# Patient Record
Sex: FEMALE | Race: WHITE
[De-identification: names, ages, dates, MRNs, and addresses within clinical notes are randomized per-mention and may not be internally consistent; named-entity substitution may affect disease eponyms.]

---

## 2017-03-29 ENCOUNTER — HOSPITAL ENCOUNTER (EMERGENCY)
Dept: HOSPITAL 75 - ER | Age: 42
Discharge: HOME | End: 2017-03-29
Payer: COMMERCIAL

## 2017-03-29 VITALS — WEIGHT: 117 LBS | HEIGHT: 61 IN | BODY MASS INDEX: 22.09 KG/M2

## 2017-03-29 VITALS — DIASTOLIC BLOOD PRESSURE: 96 MMHG | SYSTOLIC BLOOD PRESSURE: 151 MMHG

## 2017-03-29 DIAGNOSIS — G43.909: ICD-10-CM

## 2017-03-29 DIAGNOSIS — J30.1: ICD-10-CM

## 2017-03-29 DIAGNOSIS — E86.9: ICD-10-CM

## 2017-03-29 DIAGNOSIS — R11.2: Primary | ICD-10-CM

## 2017-03-29 LAB
ALBUMIN SERPL-MCNC: 4.3 G/DL (ref 3.2–4.5)
ALT SERPL-CCNC: 9 U/L (ref 0–55)
ANION GAP SERPL CALC-SCNC: 10 MMOL/L (ref 5–14)
AST SERPL-CCNC: 12 U/L (ref 5–34)
BASOPHILS # BLD AUTO: 0 10^3/UL (ref 0–0.1)
BASOPHILS NFR BLD AUTO: 0 % (ref 0–10)
BILIRUB SERPL-MCNC: 0.3 MG/DL (ref 0.1–1)
BUN SERPL-MCNC: 9 MG/DL (ref 7–18)
BUN/CREAT SERPL: 11
CALCIUM SERPL-MCNC: 9.5 MG/DL (ref 8.5–10.1)
CHLORIDE SERPL-SCNC: 106 MMOL/L (ref 98–107)
CO2 SERPL-SCNC: 24 MMOL/L (ref 21–32)
CREAT SERPL-MCNC: 0.79 MG/DL (ref 0.6–1.3)
EOSINOPHIL # BLD AUTO: 0 10^3/UL (ref 0–0.3)
EOSINOPHIL NFR BLD AUTO: 1 % (ref 0–10)
ERYTHROCYTE [DISTWIDTH] IN BLOOD BY AUTOMATED COUNT: 14.9 % (ref 10–14.5)
GFR SERPLBLD BASED ON 1.73 SQ M-ARVRAT: > 60 ML/MIN
GLUCOSE SERPL-MCNC: 114 MG/DL (ref 70–105)
LYMPHOCYTES # BLD AUTO: 1.9 X 10^3 (ref 1–4)
LYMPHOCYTES NFR BLD AUTO: 23 % (ref 12–44)
MCH RBC QN AUTO: 25 PG (ref 25–34)
MCHC RBC AUTO-ENTMCNC: 32 G/DL (ref 32–36)
MCV RBC AUTO: 79 FL (ref 80–99)
MONOCYTES # BLD AUTO: 0.5 X 10^3 (ref 0–1)
MONOCYTES NFR BLD AUTO: 6 % (ref 0–12)
NEUTROPHILS # BLD AUTO: 5.8 X 10^3 (ref 1.8–7.8)
NEUTROPHILS NFR BLD AUTO: 71 % (ref 42–75)
PLATELET # BLD: 524 10^3/UL (ref 130–400)
PMV BLD AUTO: 9 FL (ref 7.4–10.4)
POTASSIUM SERPL-SCNC: 3.8 MMOL/L (ref 3.6–5)
PROT SERPL-MCNC: 7.3 G/DL (ref 6.4–8.2)
RBC # BLD AUTO: 4.6 10^6/UL (ref 4.35–5.85)
SODIUM SERPL-SCNC: 140 MMOL/L (ref 135–145)
WBC # BLD AUTO: 8.2 10^3/UL (ref 4.3–11)

## 2017-03-29 PROCEDURE — 36415 COLL VENOUS BLD VENIPUNCTURE: CPT

## 2017-03-29 PROCEDURE — 80053 COMPREHEN METABOLIC PANEL: CPT

## 2017-03-29 PROCEDURE — 96361 HYDRATE IV INFUSION ADD-ON: CPT

## 2017-03-29 PROCEDURE — 96374 THER/PROPH/DIAG INJ IV PUSH: CPT

## 2017-03-29 PROCEDURE — 93041 RHYTHM ECG TRACING: CPT

## 2017-03-29 PROCEDURE — 84703 CHORIONIC GONADOTROPIN ASSAY: CPT

## 2017-03-29 PROCEDURE — 99282 EMERGENCY DEPT VISIT SF MDM: CPT

## 2017-03-29 PROCEDURE — 96375 TX/PRO/DX INJ NEW DRUG ADDON: CPT

## 2017-03-29 PROCEDURE — 85025 COMPLETE CBC W/AUTO DIFF WBC: CPT
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
## 2017-04-23 NOTE — XMS REPORT
Continuity of Care Document

 Created on: 2017



DEVYN HUMMEL

External Reference #: [id number]

: 1975

Sex: Female



Demographics







 Address  [address] [place] [address]  [address]

 

 Home Phone  [phone]

 

 Preferred Language  Unknown

 

 Marital Status  Unknown

 

 Zoroastrianism Affiliation  Unknown

 

 Race  Unknown

 

 Ethnic Group  Unknown





Author







 Author  [employer]

 

 Organization  [facility]

 

 Address  Unknown

 

 Phone  Unavailable







Care Team Providers







 Care Team Member Name  Role  Phone

 

 [employer]  Unavailable  Unavailable



                                    



Problems

                    





 Problem                          Status                          Onset Date   
                       Classification                          Date Reported   
                       Comments                          Source                
    

 

 Disease of breast (disorder)                                                  
  2017                          Diagnosis                          2017                                                    [facility]                    

 

 Localized obesity (disorder)                                                  
  2017                          Diagnosis                          2017                                                    [facility]                    

 

 Excess skin of abdominal wall (finding)                                       
             2017                          Diagnosis                     
     2017                                                    [facility]                    



                                                                               
                                     



Medications

                    





 Medication                          Details                          Route    
                      Status                          Patient Instructions     
                     Ordering Provider                          Order Date     
                     Source                    

 

 No Known Medications                          No known medications            
                                        Active                                 
                                                                       [facility]                    



                                                                               
     



Allergies, Adverse Reactions, Alerts

                    





 Substance                          Category                          Reaction 
                         Severity                          Reaction type       
                   Status                          Date Reported               
           Comments                          Source                    

 

 Hydrocodone                          Assertion                          
itching                                                    Drug allergy        
                                                                               
                 [employer]                    



                                                                    



Immunizations

                    





 Immunization                          Date Given                          Site
                          Status                          Last Updated         
                 Comments                          Source                    

 

 No data available for this section                                            
                                  No data available for this section           
                                                                   [facility]                    



                                                                               
     



Results

                                                                



Vital Signs

                                                                                



Encounters

                    





 Location                          Location Details                          
Encounter Type                          Encounter Number                        
  Reason For Visit                          Attending Provider                 
         ADM Date                          DC Date                          
Status                          Source                    

 

 [facility]                          [id number]                          Outpatient   
                       [id number]                                                
    Omarjose eduardo Gooden                           2017                          Active                          [facility]                    



                                                                        



Procedures

                    





 Procedure                          Code                          Date         
                 Perfomer                          Comments                    
      Source                    

 

 Abdominoplasty, Liposuction Lower Back And Flanks With Fat Transfer To Buttocks
, Bilateral Breast Augmentation                                                
    2017                                                                 
             [facility]                    

 

 Breast implant removal and upper blepharoplasty                               
                     2016                                                
                              [facility]                    

 

 elbow fx repair                                                    2010 
                                                                             
[facility]                    

 

 breast augmentation                                                    2009                                                                           
   [facility]                    

 

 mastopexy; Implant removal                                                    
2008                                                                     
         [facility]                    

 

 breast augmentation                                                    2005                                                                           
   [facility]                    

 

 gastric bypass                                                    2004  
                                                                            
[facility]                    



                                                                               
                                                                               
                      



Plan of Care

                                                                



Social History

                                                                        



Assessment and Plan

                                                                



Family History

                    





 Value                          Date                          Source           
         



                                                        



Advance Directives

                    





 Order Name                          Results                          Value    
                      Date                          Source

## 2017-04-23 NOTE — XMS REPORT
Continuity of Care Document

 Created on: 2017



ROSA ISELA HUMMEL

External Reference #: [id number]

: 1975

Sex: Female



Demographics







 Address  [address] [place] [id number]

 

 Home Phone  [phone]

 

 Preferred Language  Unknown

 

 Marital Status  Unknown

 

 Mandaen Affiliation  Unknown

 

 Race  Unknown

 

 Ethnic Group  Unknown





Author







 Author  [facility]

 

 Organization  [facility]

 

 Address  Unknown

 

 Phone  Unavailable



                                                      



Allergies

                      





 Active                    Description                    Code                  
  Type                    Severity                    Reaction                  
  Onset                    Reported/Identified                    Relationship 
to Patient                    Clinical Status                

 

 Yes                    morphine                    [id number]                 
   Drug Allergy                    Unknown                    N/A              
                           2017                                          
                



                                                                               
         



Medications

                                                                               
         



Problems

                      





 Date Dx Coded                    Attending                    Type            
        Code                    Diagnosis                    Diagnosed By      
          

 

 2015                    RICHIE COLON, CLIFFORD BASSETT                    Ot        
            Z12.31                                                          

 

 2017                    DAYSI PANCHAL DO                    Ot           
         E86.9                    VOLUME DEPLETION, UNSPECIFIED                
                     

 

 2017                    NOBLE PANCHAL DOA BOBBI                    Ot           
         G43.909                    MIGRAINE, UNSP, NOT INTRACTABLE, WITHOUT   
                                  

 

 2017                    DAYSI PANCHAL DO                    Ot           
         J30.1                    ALLERGIC RHINITIS DUE TO POLLEN              
                       

 

 2017                    DAYSI PANCHAL DO                    Ot           
         R11.2                    NAUSEA WITH VOMITING, UNSPECIFIED            
                         



                                                                               
                                                 



Procedures

                                                                               
         



Results

                      





 Test                    Result                    Range                









 Complete blood count (CBC) with automated white blood cell (WBC) differential 
- 17 18:45                









 Blood leukocytes automated count (number/volume)                    8.2 10*3/
uL                    4.3-11.0                

 

 Blood erythrocytes automated count (number/volume)                    4.60 10*6
/uL                    4.35-5.85                

 

 Venous blood hemoglobin measurement (mass/volume)                    11.5 g/dL
                    11.5-16.0                

 

 Blood hematocrit (volume fraction)                    37 %                    
35-52                

 

 Automated erythrocyte mean corpuscular volume                    79 [foz_us]  
                  80-99                

 

 Automated erythrocyte mean corpuscular hemoglobin (mass per erythrocyte)      
              25 pg                    25-34                

 

 Automated erythrocyte mean corpuscular hemoglobin concentration measurement (
mass/volume)                    32 g/dL                    32-36                

 

 Automated erythrocyte distribution width ratio                    14.9 %      
              10.0-14.5                

 

 Automated blood platelet count (count/volume)                    524 10*3/uL  
                  130-400                

 

 Automated blood platelet mean volume measurement                    9.0 [foz_us
]                    7.4-10.4                

 

 Automated blood neutrophils/100 leukocytes                    71 %            
        42-75                

 

 Automated blood lymphocytes/100 leukocytes                    23 %            
        12-44                

 

 Blood monocytes/100 leukocytes                    6 %                    0-12 
               

 

 Automated blood eosinophils/100 leukocytes                    1 %             
       0-10                

 

 Automated blood basophils/100 leukocytes                    0 %               
     0-10                

 

 Blood neutrophils automated count (number/volume)                    5.8 10*3 
                   1.8-7.8                

 

 Blood lymphocytes automated count (number/volume)                    1.9 10*3 
                   1.0-4.0                

 

 Blood monocytes automated count (number/volume)                    0.5 10*3   
                 0.0-1.0                

 

 Automated eosinophil count                    0.0 10*3/uL                    
0.0-0.3                

 

 Automated blood basophil count (count/volume)                    0.0 10*3/uL  
                  0.0-0.1                









 Serum or plasma choriogonadotropin (pregnancy test) detection - 17 18:45
                









 Serum or plasma choriogonadotropin (pregnancy test) detection                 
   NEGATIVE                     NEGATIVE                









 Comprehensive metabolic panel - 17 18:45                









 Serum or plasma sodium measurement (moles/volume)                    140 mmol/
L                    135-145                

 

 Serum or plasma potassium measurement (moles/volume)                    3.8 
mmol/L                    3.6-5.0                

 

 Serum or plasma chloride measurement (moles/volume)                    106 mmol
/L                                    

 

 Carbon dioxide                    24 mmol/L                    21-32          
      

 

 Serum or plasma anion gap determination (moles/volume)                    10 
mmol/L                    5-14                

 

 Serum or plasma urea nitrogen measurement (mass/volume)                    9 mg
/dL                    7-18                

 

 Serum or plasma creatinine measurement (mass/volume)                    0.79 mg
/dL                    0.60-1.30                

 

 Serum or plasma urea nitrogen/creatinine mass ratio                    11     
                NRG                

 

 Serum or plasma creatinine measurement with calculation of estimated 
glomerular filtration rate                    >                     NRG        
        

 

 Serum or plasma glucose measurement (mass/volume)                    114 mg/dL
                                    

 

 Serum or plasma calcium measurement (mass/volume)                    9.5 mg/dL
                    8.5-10.1                

 

 Serum or plasma total bilirubin measurement (mass/volume)                    
0.3 mg/dL                    0.1-1.0                

 

 Serum or plasma alkaline phosphatase measurement (enzymatic activity/volume)  
                  89 U/L                                    

 

 Serum or plasma aspartate aminotransferase measurement (enzymatic activity/
volume)                    12 U/L                    5-34                

 

 Serum or plasma alanine aminotransferase measurement (enzymatic activity/volume
)                    9 U/L                    0-55                

 

 Serum or plasma protein measurement (mass/volume)                    7.3 g/dL 
                   6.4-8.2                

 

 Serum or plasma albumin measurement (mass/volume)                    4.3 g/dL 
                   3.2-4.5                



                                                                               
                   



Encounters

                      





 ACCT No.                    Visit Date/Time                    Discharge      
              Status                    Pt. Type                    Provider   
                 Facility                    Loc./Unit                    
Complaint                

 

 [id number]                    2017 18:01:00                    2017 20:57:00                    DIS                    Outpatient             
       DAYSI PANCHAL DO                    [facility]     
               ER                    NAUSEA/VOMITING/MIGRAINE                

 

 [id number]                    10/13/2015 09:50:00                    10/13/
2015 23:59:59                    CLS                    Outpatient             
       RICHIE COLON, CLIFFORD BASSETT                    [facility]  
                  RAD

## 2017-12-28 ENCOUNTER — HOSPITAL ENCOUNTER (OUTPATIENT)
Dept: HOSPITAL 75 - SDC | Age: 42
End: 2017-12-28
Attending: INTERNAL MEDICINE
Payer: COMMERCIAL

## 2017-12-28 VITALS — BODY MASS INDEX: 22.09 KG/M2 | HEIGHT: 61 IN | WEIGHT: 117 LBS

## 2017-12-28 VITALS — DIASTOLIC BLOOD PRESSURE: 89 MMHG | SYSTOLIC BLOOD PRESSURE: 145 MMHG

## 2017-12-28 DIAGNOSIS — D50.9: Primary | ICD-10-CM

## 2017-12-28 PROCEDURE — 96365 THER/PROPH/DIAG IV INF INIT: CPT

## 2018-01-30 ENCOUNTER — HOSPITAL ENCOUNTER (OUTPATIENT)
Dept: HOSPITAL 75 - SDC | Age: 43
End: 2018-01-30
Attending: INTERNAL MEDICINE
Payer: COMMERCIAL

## 2018-01-30 VITALS — BODY MASS INDEX: 22.09 KG/M2 | WEIGHT: 117 LBS | HEIGHT: 61 IN

## 2018-01-30 VITALS — DIASTOLIC BLOOD PRESSURE: 71 MMHG | SYSTOLIC BLOOD PRESSURE: 133 MMHG

## 2018-01-30 VITALS — SYSTOLIC BLOOD PRESSURE: 133 MMHG | DIASTOLIC BLOOD PRESSURE: 71 MMHG

## 2018-01-30 DIAGNOSIS — D50.9: Primary | ICD-10-CM

## 2018-01-30 PROCEDURE — 96365 THER/PROPH/DIAG IV INF INIT: CPT

## 2018-01-30 PROCEDURE — 96366 THER/PROPH/DIAG IV INF ADDON: CPT

## 2021-04-28 ENCOUNTER — HOSPITAL ENCOUNTER (EMERGENCY)
Dept: HOSPITAL 75 - ER | Age: 46
Discharge: HOME | End: 2021-04-28
Payer: COMMERCIAL

## 2021-04-28 VITALS — BODY MASS INDEX: 24.56 KG/M2 | HEIGHT: 60.98 IN | WEIGHT: 130.07 LBS

## 2021-04-28 VITALS — DIASTOLIC BLOOD PRESSURE: 86 MMHG | SYSTOLIC BLOOD PRESSURE: 124 MMHG

## 2021-04-28 DIAGNOSIS — R25.3: Primary | ICD-10-CM

## 2021-04-28 DIAGNOSIS — Z79.890: ICD-10-CM

## 2021-04-28 DIAGNOSIS — E03.9: ICD-10-CM

## 2021-04-28 LAB
ALBUMIN SERPL-MCNC: 4.2 GM/DL (ref 3.2–4.5)
ALP SERPL-CCNC: 115 U/L (ref 40–136)
ALT SERPL-CCNC: 10 U/L (ref 0–55)
AMORPH SED URNS QL MICRO: (no result) /LPF
APTT PPP: YELLOW S
BACTERIA #/AREA URNS HPF: (no result) /HPF
BARBITURATES UR QL: NEGATIVE
BASOPHILS # BLD AUTO: 0.1 10^3/UL (ref 0–0.1)
BASOPHILS NFR BLD AUTO: 1 % (ref 0–10)
BENZODIAZ UR QL SCN: NEGATIVE
BILIRUB SERPL-MCNC: 0.4 MG/DL (ref 0.1–1)
BILIRUB UR QL STRIP: NEGATIVE
BUN/CREAT SERPL: 10
CALCIUM SERPL-MCNC: 9.2 MG/DL (ref 8.5–10.1)
CHLORIDE SERPL-SCNC: 104 MMOL/L (ref 98–107)
CO2 SERPL-SCNC: 25 MMOL/L (ref 21–32)
COCAINE UR QL: NEGATIVE
CREAT SERPL-MCNC: 1.22 MG/DL (ref 0.6–1.3)
EOSINOPHIL # BLD AUTO: 0.1 10^3/UL (ref 0–0.3)
EOSINOPHIL NFR BLD AUTO: 2 % (ref 0–10)
FIBRINOGEN PPP-MCNC: CLEAR MG/DL
GFR SERPLBLD BASED ON 1.73 SQ M-ARVRAT: 47 ML/MIN
GLUCOSE SERPL-MCNC: 79 MG/DL (ref 70–105)
GLUCOSE UR STRIP-MCNC: NEGATIVE MG/DL
HCT VFR BLD CALC: 39 % (ref 35–52)
HGB BLD-MCNC: 12.5 G/DL (ref 11.5–16)
KETONES UR QL STRIP: NEGATIVE
LEUKOCYTE ESTERASE UR QL STRIP: NEGATIVE
LYMPHOCYTES # BLD AUTO: 2.2 10^3/UL (ref 1–4)
LYMPHOCYTES NFR BLD AUTO: 32 % (ref 12–44)
MANUAL DIFFERENTIAL PERFORMED BLD QL: NO
MCH RBC QN AUTO: 29 PG (ref 25–34)
MCHC RBC AUTO-ENTMCNC: 32 G/DL (ref 32–36)
MCV RBC AUTO: 93 FL (ref 80–99)
METHADONE UR QL SCN: NEGATIVE
METHAMPHETAMINE SCREEN URINE S: NEGATIVE
MONOCYTES # BLD AUTO: 0.4 10^3/UL (ref 0–1)
MONOCYTES NFR BLD AUTO: 6 % (ref 0–12)
NEUTROPHILS # BLD AUTO: 3.9 10^3/UL (ref 1.8–7.8)
NEUTROPHILS NFR BLD AUTO: 59 % (ref 42–75)
NITRITE UR QL STRIP: NEGATIVE
OPIATES UR QL SCN: NEGATIVE
OXYCODONE UR QL: NEGATIVE
PH UR STRIP: 5.5 [PH] (ref 5–9)
PLATELET # BLD: 331 10^3/UL (ref 130–400)
PMV BLD AUTO: 8.9 FL (ref 9–12.2)
POTASSIUM SERPL-SCNC: 3.9 MMOL/L (ref 3.6–5)
PROPOXYPH UR QL: NEGATIVE
PROT SERPL-MCNC: 7.2 GM/DL (ref 6.4–8.2)
PROT UR QL STRIP: NEGATIVE
RBC #/AREA URNS HPF: (no result) /HPF
SODIUM SERPL-SCNC: 138 MMOL/L (ref 135–145)
SP GR UR STRIP: <=1.005 (ref 1.02–1.02)
TRICYCLICS UR QL SCN: POSITIVE
WBC # BLD AUTO: 6.7 10^3/UL (ref 4.3–11)
WBC #/AREA URNS HPF: (no result) /HPF

## 2021-04-28 PROCEDURE — 36415 COLL VENOUS BLD VENIPUNCTURE: CPT

## 2021-04-28 PROCEDURE — 80306 DRUG TEST PRSMV INSTRMNT: CPT

## 2021-04-28 PROCEDURE — 85025 COMPLETE CBC W/AUTO DIFF WBC: CPT

## 2021-04-28 PROCEDURE — 99282 EMERGENCY DEPT VISIT SF MDM: CPT

## 2021-04-28 PROCEDURE — 81000 URINALYSIS NONAUTO W/SCOPE: CPT

## 2021-04-28 PROCEDURE — 80053 COMPREHEN METABOLIC PANEL: CPT

## 2021-04-28 NOTE — ED GENERAL
General


Chief Complaint:  General Problems/Pain


Stated Complaint:  POSSIBLE SEIZURES


Nursing Triage Note:  


AMB  TO ROOM REPORTS SHE HAS BEEN  HAVING ELECTRIC SHOCKS  RUNNING THROUGH BODY.




WTIH JERKY  BODY  MOVEMENTS ONSET TODAY WHILE DRIVING.


Nursing Sepsis Screen:  No Definite Risk


Source of Information:  Patient


Exam Limitations:  No Limitations





History of Present Illness


Date Seen by Provider:  Apr 28, 2021


Time Seen by Provider:  15:15


Initial Comments


Patient is a 45-year-old female who presents to the emergency room today with a 

chief complaint of "electric shocks" causing her to twitch all over, that seem 

to be happening to her entire body.  Patient states that she noted these this 

afternoon.  She was driving at onset of symptoms.  Patient has a history of a 

seizure in the past but is currently off antiseizure medications.  Patient 

states that she has not had any recent illnesses such as fevers, chills cough, 

congestion, shortness of breath.  No nausea vomiting or diarrhea.  She does have

a mild headache that she states is usual for her.  She denies any 

over-the-counter medications and states that she takes medicine for depression 

and thyroid.  She is on Topamax and Adipex as needed for diet regulation.  

Patient denies any drug use, alcohol use or smoking.


She has never had anything like this happen to her before.  No recent stressors.





All other review of systems reviewed and negative except as stated above.


Timing/Duration:  1-3 Hours


Severity:  Moderate


Associated Systoms:  Headaches (chronic)





Allergies and Home Medications


Allergies


Coded Allergies:  


     No Known Drug Allergies (Unverified , 12/28/17)





Home Medications


Amoxicillin/Potassium Clav 1 Each Tablet, 1 EACH PO BID


   Prescribed by: DAYSI PANCHAL on 3/29/17 2000


Levothyroxine Sodium 75 Mcg Tablet, 75 MCG PO DAILY, (Reported)


Ondansetron 4 Mg Tab.rapdis, 4-8 MG PO Q4H


   Prescribed by: DAYSI PANCHAL on 3/29/17 2000





Patient Home Medication List


Home Medication List Reviewed:  Yes





Review of Systems


Review of Systems


Constitutional:  see HPI


EENTM:  no symptoms reported


Respiratory:  no symptoms reported


Cardiovascular:  no symptoms reported


Gastrointestinal:  no symptoms reported


Genitourinary:  no symptoms reported


Pregnant:  No (hysterectomy)


Musculoskeletal:  other (twitching)


Skin:  no symptoms reported


Psychiatric/Neurological:  Tremors





All Other Systems Reviewed


Negative Unless Noted:  Yes





Past Medical-Social-Family Hx


Patient Social History


Recent Infectious Disease Expo:  No


Recent Hopitalizations:  Yes (LOC 12/7/17 PT SEEN IN ER AT [facility])





Immunizations Up To Date


Date of Influenza Vaccine:  Dec 21, 2017





Seasonal Allergies


Seasonal Allergies:  Yes





Past Medical History


Abdominal, Breast, Orthopedic, Tubal Ligation


Headaches /Migraines, Seizure Disorder


GYN History:  Tubal Ligation


Hypothyroidsim





Physical Exam


Vital Signs





Vital Signs - First Documented








 4/28/21





 15:12


 


Temp 36.1


 


Pulse 99


 


Resp 18


 


B/P (MAP) 137/94 (108)


 


Pulse Ox 97


 


O2 Delivery Room Air





Capillary Refill : Less Than 3 Seconds


Height, Weight, BMI


Height: 5'1.00"


Weight: 117lbs. 0.0oz. 53.420609ao; 24.00 BMI


Method:Stated


General Appearance:  No Apparent Distress, WD/WN


Eyes:  Bilateral Eye Normal Inspection, Bilateral Eye PERRL, Bilateral Eye EOMI


HEENT:  PERRL/EOMI


Neck:  Normal Inspection


Respiratory:  Chest Non Tender, Lungs Clear, Normal Breath Sounds, No Accessory 

Muscle Use, No Respiratory Distress


Cardiovascular:  Regular Rate, Rhythm


Gastrointestinal:  Normal Bowel Sounds, Non Tender, Soft


Extremity:  Normal Inspection, Normal Range of Motion, Non Tender, No Calf 

Tenderness, No Pedal Edema


Neurologic/Psychiatric:  Alert, Oriented x3, No Motor/Sensory Deficits, Normal 

Mood/Affect


Skin:  Normal Color, Warm/Dry





Progress/Results/Core Measures


Suspected Sepsis


Recent Fever Within 48 Hours:  No


Infection Criteria Present:  None


New/Unexplained  Altered Menta:  No


Sepsis Screen:  No Definite Risk


SIRS


Temperature: 


Pulse: 99 


Respiratory Rate: 18


 


Laboratory Tests


4/28/21 15:30: White Blood Count 6.7


Blood Pressure 137 /94 


Mean: 108


 











Laboratory Tests


4/28/21 15:30: 


Creatinine 1.22, Platelet Count 331, Total Bilirubin 0.4





Results/Orders


Lab Results





Laboratory Tests








Test


 4/28/21


15:30 4/28/21


15:40 Range/Units


 


 


White Blood Count


 6.7 


 


 4.3-11.0


10^3/uL


 


Red Blood Count


 4.25 


 


 3.80-5.11


10^6/uL


 


Hemoglobin 12.5   11.5-16.0  g/dL


 


Hematocrit 39   35-52  %


 


Mean Corpuscular Volume 93   80-99  fL


 


Mean Corpuscular Hemoglobin 29   25-34  pg


 


Mean Corpuscular Hemoglobin


Concent 32 


 


 32-36  g/dL





 


Red Cell Distribution Width 13.0   10.0-14.5  %


 


Platelet Count


 331 


 


 130-400


10^3/uL


 


Mean Platelet Volume 8.9 L  9.0-12.2  fL


 


Immature Granulocyte % (Auto) 0    %


 


Neutrophils (%) (Auto) 59   42-75  %


 


Lymphocytes (%) (Auto) 32   12-44  %


 


Monocytes (%) (Auto) 6   0-12  %


 


Eosinophils (%) (Auto) 2   0-10  %


 


Basophils (%) (Auto) 1   0-10  %


 


Neutrophils # (Auto)


 3.9 


 


 1.8-7.8


10^3/uL


 


Lymphocytes # (Auto)


 2.2 


 


 1.0-4.0


10^3/uL


 


Monocytes # (Auto)


 0.4 


 


 0.0-1.0


10^3/uL


 


Eosinophils # (Auto)


 0.1 


 


 0.0-0.3


10^3/uL


 


Basophils # (Auto)


 0.1 


 


 0.0-0.1


10^3/uL


 


Immature Granulocyte # (Auto)


 0.0 


 


 0.0-0.1


10^3/uL


 


Sodium Level 138   135-145  MMOL/L


 


Potassium Level 3.9   3.6-5.0  MMOL/L


 


Chloride Level 104     MMOL/L


 


Carbon Dioxide Level 25   21-32  MMOL/L


 


Anion Gap 9   5-14  MMOL/L


 


Blood Urea Nitrogen 12   7-18  MG/DL


 


Creatinine


 1.22 


 


 0.60-1.30


MG/DL


 


Estimat Glomerular Filtration


Rate 47 


 


  





 


BUN/Creatinine Ratio 10    


 


Glucose Level 79     MG/DL


 


Calcium Level 9.2   8.5-10.1  MG/DL


 


Corrected Calcium 9.0   8.5-10.1  MG/DL


 


Total Bilirubin 0.4   0.1-1.0  MG/DL


 


Aspartate Amino Transf


(AST/SGOT) 12 


 


 5-34  U/L





 


Alanine Aminotransferase


(ALT/SGPT) 10 


 


 0-55  U/L





 


Alkaline Phosphatase 115     U/L


 


Total Protein 7.2   6.4-8.2  GM/DL


 


Albumin 4.2   3.2-4.5  GM/DL


 


Urine Opiates Screen  NEGATIVE  NEGATIVE  


 


Urine Oxycodone Screen  NEGATIVE  NEGATIVE  


 


Urine Methadone Screen  NEGATIVE  NEGATIVE  


 


Urine Propoxyphene Screen  NEGATIVE  NEGATIVE  


 


Urine Barbiturates Screen  NEGATIVE  NEGATIVE  


 


Ur Tricyclic Antidepressants


Screen 


 POSITIVE H


 NEGATIVE  





 


Urine Phencyclidine Screen  NEGATIVE  NEGATIVE  


 


Urine Amphetamines Screen  POSITIVE H NEGATIVE  


 


Urine Methamphetamines Screen  NEGATIVE  NEGATIVE  


 


Urine Benzodiazepines Screen  NEGATIVE  NEGATIVE  


 


Urine Cocaine Screen  NEGATIVE  NEGATIVE  


 


Urine Cannabinoids Screen  NEGATIVE  NEGATIVE  








Vital Signs/I&O











 4/28/21





 15:12


 


Temp 36.1


 


Pulse 99


 


Resp 18


 


B/P (MAP) 137/94 (108)


 


Pulse Ox 97


 


O2 Delivery Room Air





Capillary Refill : Less Than 3 Seconds








Blood Pressure Mean:                    108











Departure


Impression





   Primary Impression:  


   Twitching


Disposition:  01 HOME, SELF-CARE


Condition:  Stable





Departure-Patient Inst.


Decision time for Depature:  16:15


Referrals:  


CLIFFORD QUIROZ MD (PCP/Family)


Primary Care Physician


Patient Instructions:  Muscle Spasms (DC)





Add. Discharge Instructions:  


Drink plenty of fluids to stay well hydrated.





Hold off on taking your diet pills for the next 48 hours.





Return to the Emergency Department for re-evaluation if you have persistent or 

worsening symptoms, worsening headache or any other emergent, concerning 

symptoms develop.





Copy


Copies To 1:   CLIFFORD QUIROZ MD, KATHRYN M MD         Apr 28, 2021 15:34

## 2021-11-10 ENCOUNTER — HOSPITAL ENCOUNTER (OUTPATIENT)
Dept: HOSPITAL 75 - RAD | Age: 46
End: 2021-11-10
Attending: INTERNAL MEDICINE
Payer: COMMERCIAL

## 2021-11-10 DIAGNOSIS — Z12.31: Primary | ICD-10-CM

## 2021-11-10 PROCEDURE — 77063 BREAST TOMOSYNTHESIS BI: CPT

## 2021-11-10 PROCEDURE — 77067 SCR MAMMO BI INCL CAD: CPT

## 2021-11-10 NOTE — DIAGNOSTIC IMAGING REPORT
INDICATION: 

Routine screening.



COMPARISON:     

10/13/2015.



TECHNIQUE: 

2D and 3D bilateral screening mammography was performed with CAD.



FINDINGS:

Bilateral subpectoral breast implants are noted. The implant

contours appear smooth. Scattered fibroglandular densities in

both breasts are noted. Benign calcifications in the central left

breast and a benign nodule in the lateral left breast appear

stable. No new mass or malignant-appearing microcalcifications

are seen. The axillae are unremarkable.



IMPRESSION: 

No mammographic features suspicious for malignancy are

identified.



ACR BI-RADS Category 2: Benign findings.

Result letter will be mailed to the patient.

Note: At least 10% of breast cancer is not imaged by mammography.



Dictated by: 



  Dictated on workstation # [id number]